# Patient Record
Sex: MALE | Race: WHITE | Employment: OTHER | URBAN - METROPOLITAN AREA
[De-identification: names, ages, dates, MRNs, and addresses within clinical notes are randomized per-mention and may not be internally consistent; named-entity substitution may affect disease eponyms.]

---

## 2017-06-15 PROBLEM — N52.02 CORPORO-VENOUS OCCLUSIVE ERECTILE DYSFUNCTION: Status: ACTIVE | Noted: 2017-06-15

## 2017-06-15 PROBLEM — N40.0 BENIGN PROSTATIC HYPERPLASIA, PRESENCE OF LOWER URINARY TRACT SYMPTOMS UNSPECIFIED, UNSPECIFIED MORPHOLOGY: Status: ACTIVE | Noted: 2017-06-15

## 2017-11-30 PROBLEM — N40.0 BENIGN PROSTATIC HYPERPLASIA: Status: ACTIVE | Noted: 2017-06-15

## 2018-03-15 ENCOUNTER — ANESTHESIA EVENT (OUTPATIENT)
Dept: SURGERY | Facility: HOSPITAL | Age: 73
End: 2018-03-15

## 2018-03-16 ENCOUNTER — SURGERY (OUTPATIENT)
Age: 73
End: 2018-03-16

## 2018-03-16 ENCOUNTER — ANESTHESIA (OUTPATIENT)
Dept: SURGERY | Facility: HOSPITAL | Age: 73
End: 2018-03-16

## 2018-03-16 ENCOUNTER — HOSPITAL ENCOUNTER (OUTPATIENT)
Facility: HOSPITAL | Age: 73
Setting detail: HOSPITAL OUTPATIENT SURGERY
Discharge: HOME | End: 2018-03-16
Attending: UROLOGY | Admitting: UROLOGY
Payer: MEDICARE

## 2018-03-16 VITALS
RESPIRATION RATE: 18 BRPM | HEIGHT: 68 IN | SYSTOLIC BLOOD PRESSURE: 123 MMHG | OXYGEN SATURATION: 95 % | BODY MASS INDEX: 26.06 KG/M2 | HEART RATE: 72 BPM | TEMPERATURE: 98 F | DIASTOLIC BLOOD PRESSURE: 71 MMHG | WEIGHT: 171.94 LBS

## 2018-03-16 DIAGNOSIS — N40.0 BENIGN PROSTATIC HYPERPLASIA, UNSPECIFIED WHETHER LOWER URINARY TRACT SYMPTOMS PRESENT: ICD-10-CM

## 2018-03-16 PROCEDURE — 0V508ZZ DESTRUCTION OF PROSTATE, VIA NATURAL OR ARTIFICIAL OPENING ENDOSCOPIC: ICD-10-PCS | Performed by: UROLOGY

## 2018-03-16 RX ORDER — METOCLOPRAMIDE HYDROCHLORIDE 5 MG/ML
10 INJECTION INTRAMUSCULAR; INTRAVENOUS AS NEEDED
Status: DISCONTINUED | OUTPATIENT
Start: 2018-03-16 | End: 2018-03-16

## 2018-03-16 RX ORDER — MIDAZOLAM HYDROCHLORIDE 1 MG/ML
1 INJECTION INTRAMUSCULAR; INTRAVENOUS EVERY 5 MIN PRN
Status: DISCONTINUED | OUTPATIENT
Start: 2018-03-16 | End: 2018-03-16

## 2018-03-16 RX ORDER — LIDOCAINE HYDROCHLORIDE 20 MG/ML
JELLY TOPICAL AS NEEDED
Status: DISCONTINUED | OUTPATIENT
Start: 2018-03-16 | End: 2018-03-16 | Stop reason: HOSPADM

## 2018-03-16 RX ORDER — FUROSEMIDE 10 MG/ML
10 INJECTION INTRAMUSCULAR; INTRAVENOUS ONCE
Status: COMPLETED | OUTPATIENT
Start: 2018-03-16 | End: 2018-03-16

## 2018-03-16 RX ORDER — MEPERIDINE HYDROCHLORIDE 25 MG/ML
12.5 INJECTION INTRAMUSCULAR; INTRAVENOUS; SUBCUTANEOUS AS NEEDED
Status: DISCONTINUED | OUTPATIENT
Start: 2018-03-16 | End: 2018-03-16

## 2018-03-16 RX ORDER — HYDROCODONE BITARTRATE AND ACETAMINOPHEN 5; 325 MG/1; MG/1
2 TABLET ORAL AS NEEDED
Status: DISCONTINUED | OUTPATIENT
Start: 2018-03-16 | End: 2018-03-16

## 2018-03-16 RX ORDER — PHENAZOPYRIDINE HYDROCHLORIDE 200 MG/1
200 TABLET, FILM COATED ORAL 3 TIMES DAILY PRN
Qty: 15 TABLET | Refills: 1 | Status: SHIPPED | OUTPATIENT
Start: 2018-03-16 | End: 2018-03-31

## 2018-03-16 RX ORDER — ONDANSETRON 2 MG/ML
4 INJECTION INTRAMUSCULAR; INTRAVENOUS AS NEEDED
Status: DISCONTINUED | OUTPATIENT
Start: 2018-03-16 | End: 2018-03-16

## 2018-03-16 RX ORDER — SODIUM CHLORIDE, SODIUM LACTATE, POTASSIUM CHLORIDE, CALCIUM CHLORIDE 600; 310; 30; 20 MG/100ML; MG/100ML; MG/100ML; MG/100ML
INJECTION, SOLUTION INTRAVENOUS CONTINUOUS
Status: DISCONTINUED | OUTPATIENT
Start: 2018-03-16 | End: 2018-03-16

## 2018-03-16 RX ORDER — CEFAZOLIN SODIUM/WATER 2 G/20 ML
2 SYRINGE (ML) INTRAVENOUS ONCE
Status: COMPLETED | OUTPATIENT
Start: 2018-03-16 | End: 2018-03-16

## 2018-03-16 RX ORDER — MORPHINE SULFATE 4 MG/ML
2 INJECTION, SOLUTION INTRAMUSCULAR; INTRAVENOUS EVERY 5 MIN PRN
Status: DISCONTINUED | OUTPATIENT
Start: 2018-03-16 | End: 2018-03-16

## 2018-03-16 RX ORDER — NALOXONE HYDROCHLORIDE 0.4 MG/ML
80 INJECTION, SOLUTION INTRAMUSCULAR; INTRAVENOUS; SUBCUTANEOUS AS NEEDED
Status: DISCONTINUED | OUTPATIENT
Start: 2018-03-16 | End: 2018-03-16

## 2018-03-16 RX ORDER — ATROPA BELLADONNA AND OPIUM 16.2; 6 MG/1; MG/1
SUPPOSITORY RECTAL AS NEEDED
Status: DISCONTINUED | OUTPATIENT
Start: 2018-03-16 | End: 2018-03-16 | Stop reason: HOSPADM

## 2018-03-16 RX ORDER — HYDROCODONE BITARTRATE AND ACETAMINOPHEN 5; 325 MG/1; MG/1
1 TABLET ORAL AS NEEDED
Status: DISCONTINUED | OUTPATIENT
Start: 2018-03-16 | End: 2018-03-16

## 2018-03-16 RX ORDER — ALBUTEROL SULFATE 2.5 MG/3ML
2.5 SOLUTION RESPIRATORY (INHALATION) ONCE AS NEEDED
Status: DISCONTINUED | OUTPATIENT
Start: 2018-03-16 | End: 2018-03-16

## 2018-03-16 RX ORDER — FUROSEMIDE 10 MG/ML
INJECTION INTRAMUSCULAR; INTRAVENOUS
Status: COMPLETED
Start: 2018-03-16 | End: 2018-03-16

## 2018-03-16 NOTE — ANESTHESIA POSTPROCEDURE EVALUATION
[facility]    Terese Martin Patient Status:  Hospital Outpatient Surgery   Age/Gender 67year old male MRN [id number]   [facility] SURGERY Attending Fish Haji MD   Hosp Day # 0 PCP Nohemy Johnston MD       Anesthesia Post-op

## 2018-03-16 NOTE — BRIEF OP NOTE
Pre-Operative Diagnosis: Benign prostatic hyperplasia, unspecified whether lower urinary tract symptoms present [N40.0]     Post-Operative Diagnosis: Benign prostatic hyperplasia, unspecified whether lower urinary tract symptoms present [N40.0]     Proc

## 2018-03-16 NOTE — H&P
History & Physical Examination    Patient Name: Michelle Damon  MRN: [id number]  [facility]: [id number]  YOB: 1945    Diagnosis: LARGE BPH WITH OBSTRUCTION    Present Illness: LARGE BPH WITH OBSTRUCTION      No prescriptions prior to admission.     No Problem Relation Age of Onset   • Cancer Father      colon cancer   • Cancer Mother      cervical or uterine CA   • Heart Disorder Mother      STROKE   • Psychiatric Son      schizophrenia        Smoking status: Never Smoker    Smokeless tobacco: Never U

## 2018-03-17 NOTE — OPERATIVE REPORT
[address]    PATIENT'S NAME: Saritha Troy   ATTENDING PHYSICIAN: Anabelle Husain M.D. OPERATING PHYSICIAN: Anabelle Husain M.D.    PATIENT ACCOUNT#:   [de-identified]    LOCATION:  PREOPASCC EH PRE ASCC 13 EDWP 10  MEDICAL RECORD #:   [id number]       DATE OF neck aperture was then seen from the midline to the 11 o'clock and ventrally 12 o'clock positions. Further vaporization was performed.   Attention was then directed to the right side of the prostate gland, and from the 7 o'clock to 1 o'clock position vapor

## 2018-11-09 PROCEDURE — 84153 ASSAY OF PSA TOTAL: CPT | Performed by: UROLOGY

## 2018-11-09 PROCEDURE — 36415 COLL VENOUS BLD VENIPUNCTURE: CPT | Performed by: UROLOGY

## 2018-11-09 PROCEDURE — 84154 ASSAY OF PSA FREE: CPT | Performed by: UROLOGY

## 2019-05-07 PROCEDURE — 88305 TISSUE EXAM BY PATHOLOGIST: CPT | Performed by: INTERNAL MEDICINE

## 2020-10-27 NOTE — ANESTHESIA PREPROCEDURE EVALUATION
LEUKOCYTES:neg  NITRATES:neg  BLOOD:neg  KETONES:neg       PRE-OP EVALUATION    Patient Name: Alivia Cartwright    Pre-op Diagnosis: Benign prostatic hyperplasia, unspecified whether lower urinary tract symptoms present [N40.0]    Procedure(s):  CYSTOSCOPY, TRANSURETHRAL RESECTION OF PROSTATE AGUSTÍN DOUGLAS LASER    Surgeon Pulmonary    Negative pulmonary ROS.                        Neuro/Psych        (+) anxiety                      PER EPIC Patient Active Problem List:     Essential hypertension, benign     Mixed hyperlipidemia     Chronic ischemic heart disease, unspecifi 12.7 02/22/2018    02/22/2018       Lab Results  Component Value Date    02/22/2018   K 4.7 02/22/2018    02/22/2018   CO2 29.9 02/22/2018   BUN 15 02/22/2018   CREATSERUM 1.12 02/22/2018   GLU 88 02/22/2018   CA 9.2 02/22/2018

## 2021-04-01 PROBLEM — Z95.5 HISTORY OF RIGHT CORONARY ARTERY STENT PLACEMENT: Status: ACTIVE | Noted: 2021-04-01

## (undated) DEVICE — SOL  .9 3000ML

## (undated) DEVICE — Device

## (undated) DEVICE — TAPE CLOTH ADHESIVE 3

## (undated) DEVICE — GLOVE SURG SENSICARE SZ 7

## (undated) DEVICE — KENDALL SCD EXPRESS SLEEVES, KNEE LENGTH, MEDIUM: Brand: KENDALL SCD

## (undated) DEVICE — SOL H2O 1000ML BTL

## (undated) DEVICE — URINE DRAINAGE BAG,BAG, NEEDLE SAMPLING, DRAIN TUBE: Brand: DOVER

## (undated) DEVICE — SOL  .9 1000ML BAG

## (undated) DEVICE — 1071 S-DRP URO STLE-GAMA 10/BX,4X/C: Brand: STERI-DRAPE™

## (undated) DEVICE — SYRINGE 30ML LL TIP

## (undated) DEVICE — PLASTC TOOMEY SYRNG DISP

## (undated) DEVICE — CYSTO CDS-LF: Brand: MEDLINE INDUSTRIES, INC.